# Patient Record
Sex: FEMALE | Race: WHITE | NOT HISPANIC OR LATINO
[De-identification: names, ages, dates, MRNs, and addresses within clinical notes are randomized per-mention and may not be internally consistent; named-entity substitution may affect disease eponyms.]

---

## 2017-05-23 ENCOUNTER — TRANSCRIPTION ENCOUNTER (OUTPATIENT)
Age: 52
End: 2017-05-23

## 2018-08-04 ENCOUNTER — RECORD ABSTRACTING (OUTPATIENT)
Age: 53
End: 2018-08-04

## 2018-08-04 DIAGNOSIS — Z87.09 PERSONAL HISTORY OF OTHER DISEASES OF THE RESPIRATORY SYSTEM: ICD-10-CM

## 2018-08-04 DIAGNOSIS — I10 ESSENTIAL (PRIMARY) HYPERTENSION: ICD-10-CM

## 2018-08-04 DIAGNOSIS — Z82.5 FAMILY HISTORY OF ASTHMA AND OTHER CHRONIC LOWER RESPIRATORY DISEASES: ICD-10-CM

## 2018-08-31 ENCOUNTER — APPOINTMENT (OUTPATIENT)
Dept: PULMONOLOGY | Facility: CLINIC | Age: 53
End: 2018-08-31
Payer: COMMERCIAL

## 2018-08-31 VITALS
BODY MASS INDEX: 37.9 KG/M2 | SYSTOLIC BLOOD PRESSURE: 135 MMHG | HEART RATE: 69 BPM | DIASTOLIC BLOOD PRESSURE: 77 MMHG | OXYGEN SATURATION: 98 % | WEIGHT: 222 LBS | HEIGHT: 64 IN

## 2018-08-31 PROCEDURE — 99213 OFFICE O/P EST LOW 20 MIN: CPT

## 2019-04-18 ENCOUNTER — RESULT REVIEW (OUTPATIENT)
Age: 54
End: 2019-04-18

## 2020-08-06 LAB — SARS-COV-2 N GENE NPH QL NAA+PROBE: NOT DETECTED

## 2020-08-11 ENCOUNTER — APPOINTMENT (OUTPATIENT)
Dept: PULMONOLOGY | Facility: CLINIC | Age: 55
End: 2020-08-11
Payer: COMMERCIAL

## 2020-08-11 ENCOUNTER — RESULT REVIEW (OUTPATIENT)
Age: 55
End: 2020-08-11

## 2020-08-11 VITALS
BODY MASS INDEX: 42.68 KG/M2 | DIASTOLIC BLOOD PRESSURE: 80 MMHG | SYSTOLIC BLOOD PRESSURE: 128 MMHG | RESPIRATION RATE: 14 BRPM | HEIGHT: 64 IN | HEART RATE: 70 BPM | WEIGHT: 250 LBS | OXYGEN SATURATION: 97 %

## 2020-08-11 LAB — POCT - HEMOGLOBIN (HGB), QUANTITATIVE, TRANSCUTANEOUS: 12.1

## 2020-08-11 PROCEDURE — 94060 EVALUATION OF WHEEZING: CPT

## 2020-08-11 PROCEDURE — 99213 OFFICE O/P EST LOW 20 MIN: CPT | Mod: 25

## 2020-08-11 PROCEDURE — 94726 PLETHYSMOGRAPHY LUNG VOLUMES: CPT

## 2020-08-11 PROCEDURE — 94750: CPT

## 2020-08-11 PROCEDURE — 94729 DIFFUSING CAPACITY: CPT

## 2020-08-11 PROCEDURE — 88738 HGB QUANT TRANSCUTANEOUS: CPT

## 2020-08-11 NOTE — ASSESSMENT
[FreeTextEntry1] : Patient is compliant with CPAP.  AHI has increased\par May reflect over distention of airways\par Recommend lowering pressure to 5 and checking data in 1 week\par Recommend dental evaluation for oral appliance therapy as CPAP alternative\par Encouraged weight loss\par Discussed inspire implant patient not a candidate because of elevated BMI\par \par pft-normal

## 2020-08-11 NOTE — HISTORY OF PRESENT ILLNESS
[TextBox_4] : Follow-up for sleep apnea\par History of moderate RODGER\par Reports weight loss since diagnostic study (although unable to document)\par Reports ear popping on CPAP\par Interested in coming off CPAP and seeking CPAP alternative\par

## 2020-08-11 NOTE — PROCEDURE
[FreeTextEntry1] : Compliance Summary\par 7/12/2020 - 8/10/2020 (30 days)\par Days with Device Usage 30 days\par Days without Device Usage 0 days\par Percent Days with Device Usage 100.0%\par Cumulative Usage 9 days 2 hrs. 30 mins. 4 secs.\par Maximum Usage (1 Day) 9 hrs. 22 mins. 17 secs.\par Average Usage (All Days) 7 hrs. 17 mins.\par Average Usage (Days Used) 7 hrs. 17 mins.\par Minimum Usage (1 Day) 3 hrs. 5 mins. 6 secs.\par \par pft-normal; improved\par Percent of Days with Usage >= 4 Hours 96.7%\par Percent of Days with Usage < 4 Hours 3.3%\par Date Range\par Total Blower Time 9 days 2 hrs. 34 mins. 22 secs.\par Average AHI 6.7\par Auto-CPAP Summary\par Auto-CPAP Mean Pressure 7.6 cmH2O\par Auto-CPAP Peak Average Pressure 9.0 cmH2O\par Average Device Pressure <= 90% of Time 9.0 cmH2O\par Average Time in Large Leak Per Day 0 secs

## 2021-08-16 ENCOUNTER — APPOINTMENT (OUTPATIENT)
Dept: PULMONOLOGY | Facility: CLINIC | Age: 56
End: 2021-08-16
Payer: COMMERCIAL

## 2021-08-16 PROCEDURE — 99213 OFFICE O/P EST LOW 20 MIN: CPT | Mod: 95

## 2021-08-16 NOTE — ASSESSMENT
[FreeTextEntry1] : Patient is currently on treatment with PAP. Data download confirms excellent compliance. Patient continues to use treatment and is benefiting from it.\par Patient was informed about the recall of Huber RespirLinkfluences CPAP machines.  Risks and benefits of continuing treatment versus interruption of treatment discussed.  Patient encouraged to go to the  website and to reach out to DME regarding device upgrade and/or replacement.\par She is actually eligible for replacement device as her 5-year renewal is actually tomorrow\par will order\par Follow up for data download and compliance assessment.\par

## 2021-08-16 NOTE — PROCEDURE
[FreeTextEntry1] : Compliance Summary\par 7/10/2021 - 8/8/2021 (30 days)\par Days with Device Usage 29 days\par Days without Device Usage 1 day\par Percent Days with Device Usage 96.7%\par Cumulative Usage 9 days 14 hrs. 43 mins. 44 secs.\par Maximum Usage (1 Day) 11 hrs. 27 mins. 8 secs.\par Average Usage (All Days) 7 hrs. 41 mins. 27 secs.\par Average Usage (Days Used) 7 hrs. 57 mins. 22 secs.\par Minimum Usage (1 Day) 43 mins. 30 secs.\par Percent of Days with Usage >= 4 Hours 93.3%\par Percent of Days with Usage < 4 Hours 6.7%\par Date Range\par Total Blower Time 9 days 14 hrs. 43 mins. 55 secs.\par CPAP Summary\par Average Time in Large Leak Per Day 0 secs.\par Average AHI 3.7\par CPAP 5.0 cmH2O

## 2021-08-16 NOTE — HISTORY OF PRESENT ILLNESS
[TextBox_4] : Telehealth visit to discuss recall on Huber [employer]ironics auto CPAP.  Has been using device on a nightly basis without difficulty.  Stopped using CPAP last week as she was diagnosed with Covid.  She is convalescing at home without difficulty and is normally symptomatic\par Currently on DreamStation auto\par Setting CPAP 5 DreamWear mask

## 2021-08-16 NOTE — REASON FOR VISIT
[Home] : at home, [unfilled] , at the time of the visit. [Medical Office: ([facility])___] : at the medical office located in  [Verbal consent obtained from patient] : the patient, [unfilled]

## 2021-10-11 ENCOUNTER — APPOINTMENT (OUTPATIENT)
Dept: PULMONOLOGY | Facility: CLINIC | Age: 56
End: 2021-10-11
Payer: COMMERCIAL

## 2021-10-11 VITALS — OXYGEN SATURATION: 95 % | HEART RATE: 92 BPM

## 2021-10-11 DIAGNOSIS — G47.33 OBSTRUCTIVE SLEEP APNEA (ADULT) (PEDIATRIC): ICD-10-CM

## 2021-10-11 PROCEDURE — 94660 CPAP INITIATION&MGMT: CPT

## 2021-10-12 ENCOUNTER — APPOINTMENT (OUTPATIENT)
Dept: PULMONOLOGY | Facility: CLINIC | Age: 56
End: 2021-10-12

## 2021-10-13 NOTE — ASSESSMENT
[FreeTextEntry1] : Patient complained if settings were accurate on new Dreamstation 2 that was received to replace old CPAP machine. RT checked settings and accurate mask fitting. CPAP set at 5cm H2o with medium nasal mask. Patient also complains of ear pressure or popping. RT advised patient to follow up with MD.

## 2024-04-09 ENCOUNTER — NON-APPOINTMENT (OUTPATIENT)
Age: 59
End: 2024-04-09

## 2024-09-05 ENCOUNTER — NON-APPOINTMENT (OUTPATIENT)
Age: 59
End: 2024-09-05

## 2025-04-01 ENCOUNTER — NON-APPOINTMENT (OUTPATIENT)
Age: 60
End: 2025-04-01

## 2025-07-30 ENCOUNTER — NON-APPOINTMENT (OUTPATIENT)
Age: 60
End: 2025-07-30